# Patient Record
Sex: FEMALE | Race: WHITE | NOT HISPANIC OR LATINO
[De-identification: names, ages, dates, MRNs, and addresses within clinical notes are randomized per-mention and may not be internally consistent; named-entity substitution may affect disease eponyms.]

---

## 2023-04-21 PROBLEM — Z00.00 ENCOUNTER FOR PREVENTIVE HEALTH EXAMINATION: Status: ACTIVE | Noted: 2023-04-21

## 2023-04-26 ENCOUNTER — APPOINTMENT (OUTPATIENT)
Dept: VASCULAR SURGERY | Facility: CLINIC | Age: 28
End: 2023-04-26
Payer: MEDICAID

## 2023-04-26 VITALS
HEIGHT: 62 IN | BODY MASS INDEX: 23 KG/M2 | SYSTOLIC BLOOD PRESSURE: 110 MMHG | DIASTOLIC BLOOD PRESSURE: 70 MMHG | HEART RATE: 71 BPM | WEIGHT: 125 LBS

## 2023-04-26 DIAGNOSIS — I83.93 ASYMPTOMATIC VARICOSE VEINS OF BILATERAL LOWER EXTREMITIES: ICD-10-CM

## 2023-04-26 PROCEDURE — 93970 EXTREMITY STUDY: CPT

## 2023-04-26 PROCEDURE — 99203 OFFICE O/P NEW LOW 30 MIN: CPT

## 2023-05-24 NOTE — HISTORY OF PRESENT ILLNESS
[FreeTextEntry1] : 28 y/o F referred by [facility] with no significant PMHx. She presents with concerns of varicose veins and wants to see if something is needed. She reports the veins are intermittently bothersome, especially behind the knees and on the right ankle. Denies any burning, itching, previous vein procedures, ulcers or swelling. The veins become more bothersome during summer time and with pregnancies. She has had a total of 4 pregnancies and with each pregnancy they veins have worsened. During pregnancies, she wears thigh high compression stockings. Otherwise, she wears knee high stockings. The stockings help with her symptoms. She stays active and is on her feet a lot. She mentions some stiffness on the knees and leg pain while laying down. Symptoms not consistent with rest pain.

## 2023-05-24 NOTE — ADDENDUM
[FreeTextEntry1] : I, Dr. Danilo Sevilla, personally performed the evaluation and management (E/M) services for this new patient.  That E/M includes conducting the initial examination, assessing all conditions, and establishing the plan of care.  Today, my ACP, Ada Gordon NP, was here to observe my evaluation and management services for this patient to be followed going forward.

## 2023-05-24 NOTE — PHYSICAL EXAM
[Respiratory Effort] : normal respiratory effort [2+] : left 2+ [Varicose Veins Of Lower Extremities] : bilaterally [Ankle Swelling On The Right] : mild [No Rash or Lesion] : No rash or lesion [Alert] : alert [Oriented to Person] : oriented to person [Oriented to Place] : oriented to place [Oriented to Time] : oriented to time [Calm] : calm [Ankle Swelling (On Exam)] : not present [] : not present [de-identified] : WN/WD [FreeTextEntry1] : Bilt mildly bulging varicose veins on both medial thighs, popliteal fossae and medial calves, R>L. Spider veins on bilateral ankles. [de-identified] : FROM

## 2023-05-24 NOTE — ASSESSMENT
[Arterial/Venous Disease] : arterial/venous disease [FreeTextEntry1] : 28 y/o F w/ intermittently symptomatic varicose veins.\par On exam, bilt mildly bulging varicose veins on both medial thighs, popliteal fossae and medial calves, R>L. Spider veins on bilateral ankles.\par Venous duplex confirmed no evidence of reflux on either GSV or deep veins. vv on bilt thighs and calves c/w exam findings. \par Discussed findings with pt and recommend she continues to stay active, keep skin moisturized and use the compression stockings. We discussed possible stab phlebectomy if the veins become very bothersome. Her knee and leg pain while laying down is likely musculoskeletal in nature. Reassured her the legs are well perfused. She may f/u prn.

## 2023-05-24 NOTE — PROCEDURE
[FreeTextEntry1] : Venous duplex ordered to r/o insuff and eval vv, shows: bilateral negative DVT/SVT. No reflux. vv in thighs and calves.